# Patient Record
Sex: FEMALE | Race: BLACK OR AFRICAN AMERICAN
[De-identification: names, ages, dates, MRNs, and addresses within clinical notes are randomized per-mention and may not be internally consistent; named-entity substitution may affect disease eponyms.]

---

## 2019-03-05 ENCOUNTER — HOSPITAL ENCOUNTER (INPATIENT)
Dept: HOSPITAL 74 - JLDR | Age: 24
LOS: 2 days | Discharge: HOME | DRG: 560 | End: 2019-03-07
Attending: OBSTETRICS & GYNECOLOGY | Admitting: OBSTETRICS & GYNECOLOGY
Payer: COMMERCIAL

## 2019-03-05 VITALS — BODY MASS INDEX: 30.4 KG/M2

## 2019-03-05 DIAGNOSIS — D64.9: ICD-10-CM

## 2019-03-05 DIAGNOSIS — Z3A.39: ICD-10-CM

## 2019-03-05 LAB
ANION GAP SERPL CALC-SCNC: 10 MMOL/L (ref 8–16)
APTT BLD: 26.3 SECONDS (ref 25.2–36.5)
BASOPHILS # BLD: 0.5 % (ref 0–2)
BUN SERPL-MCNC: 7 MG/DL (ref 7–18)
CALCIUM SERPL-MCNC: 9 MG/DL (ref 8.5–10.1)
CHLORIDE SERPL-SCNC: 104 MMOL/L (ref 98–107)
CO2 SERPL-SCNC: 22 MMOL/L (ref 21–32)
CREAT SERPL-MCNC: 0.6 MG/DL (ref 0.55–1.3)
DEPRECATED RDW RBC AUTO: 17.7 % (ref 11.6–15.6)
EOSINOPHIL # BLD: 2.6 % (ref 0–4.5)
GLUCOSE SERPL-MCNC: 86 MG/DL (ref 74–106)
HCT VFR BLD CALC: 32 % (ref 32.4–45.2)
HGB BLD-MCNC: 10.7 GM/DL (ref 10.7–15.3)
INR BLD: 0.96 (ref 0.83–1.09)
LYMPHOCYTES # BLD: 18 % (ref 8–40)
MCH RBC QN AUTO: 28.3 PG (ref 25.7–33.7)
MCHC RBC AUTO-ENTMCNC: 33.4 G/DL (ref 32–36)
MCV RBC: 84.5 FL (ref 80–96)
MONOCYTES # BLD AUTO: 8.8 % (ref 3.8–10.2)
NEUTROPHILS # BLD: 70.1 % (ref 42.8–82.8)
PLATELET # BLD AUTO: 251 K/MM3 (ref 134–434)
PMV BLD: 9 FL (ref 7.5–11.1)
POTASSIUM SERPLBLD-SCNC: 4.3 MMOL/L (ref 3.5–5.1)
PT PNL PPP: 11.3 SEC (ref 9.7–13)
RBC # BLD AUTO: 3.79 M/MM3 (ref 3.6–5.2)
SODIUM SERPL-SCNC: 136 MMOL/L (ref 136–145)
WBC # BLD AUTO: 10 K/MM3 (ref 4–10)

## 2019-03-05 NOTE — PN
Delivery





- Delivery


Vaginal Delivery: No Problems, Spontaneous (baby boy delivered vx , Raz position

, cord around  ant shoulder untangled before delivery of the rest of the body, 

oral & nasal suction was done .placenta & membranes delievered completely, 

perineum & vagina was intact)


Episiotomy/Laceration: None


EBL (cc): 300





Delivery, Single Birth





- Stages of Labor


Date 1st Stage Initiatied: 19


Time 1st Stage Initiated: 09:00


Date 2nd Stage Initiated: 19


Time 2nd Stage Initiated: 23:20


Date of Delivery: 19


Time of Delivery: 23:26


Date Placenta Delivered: 19


Time Placenta Delivered: 23:30


Placenta: Yes: Spontaneous, Uterine Exploration





- Condition of Infant


Infant Gender: Male


Birth Weight: 7 lb 8 oz


Position: Left, OA (cord around anterior shoulder)


Total Hours ROM (Hrs/Mins): 20 min SROM clear  large 





- Apgar


  ** 1 Minute


Apgar Total Score: 9





  ** 5 Minutes


Apgar Total Score: 9





-  Feeding Plan


Initial Plan: Elected not to breastfeed exclusively throughout hospitalization





Remarks





- Remarks


Remarks: 


24 yrs  , 39.1 weeks by sono & 38.4 weeks by dates , gbs neg , admitted 

in labor 


PNC at [address] [place] [address] 


Intrapartum stadol 2 mg + phenrgan 25 mg iv stat given for labor analgesia 


pitocin augmentation was started 


intrapartum course uneventful
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
## 2019-03-06 LAB
BASOPHILS # BLD: 0.8 % (ref 0–2)
DEPRECATED RDW RBC AUTO: 17.5 % (ref 11.6–15.6)
EOSINOPHIL # BLD: 1.5 % (ref 0–4.5)
HCT VFR BLD CALC: 27.8 % (ref 32.4–45.2)
HGB BLD-MCNC: 9.2 GM/DL (ref 10.7–15.3)
LYMPHOCYTES # BLD: 16.4 % (ref 8–40)
MCH RBC QN AUTO: 27.7 PG (ref 25.7–33.7)
MCHC RBC AUTO-ENTMCNC: 33.1 G/DL (ref 32–36)
MCV RBC: 83.6 FL (ref 80–96)
MONOCYTES # BLD AUTO: 9.6 % (ref 3.8–10.2)
NEUTROPHILS # BLD: 71.7 % (ref 42.8–82.8)
PLATELET # BLD AUTO: 234 K/MM3 (ref 134–434)
PMV BLD: 8.6 FL (ref 7.5–11.1)
RBC # BLD AUTO: 3.33 M/MM3 (ref 3.6–5.2)
WBC # BLD AUTO: 13.5 K/MM3 (ref 4–10)

## 2019-03-06 RX ADMIN — ACETAMINOPHEN PRN MG: 325 TABLET ORAL at 10:17

## 2019-03-06 RX ADMIN — ACETAMINOPHEN PRN MG: 325 TABLET ORAL at 16:07

## 2019-03-06 RX ADMIN — IBUPROFEN PRN MG: 600 TABLET, FILM COATED ORAL at 10:16

## 2019-03-06 RX ADMIN — Medication SCH TAB: at 10:18

## 2019-03-06 RX ADMIN — FERROUS SULFATE TAB EC 324 MG (65 MG FE EQUIVALENT) SCH MG: 324 (65 FE) TABLET DELAYED RESPONSE at 17:40

## 2019-03-06 RX ADMIN — FERROUS SULFATE TAB EC 324 MG (65 MG FE EQUIVALENT) SCH MG: 324 (65 FE) TABLET DELAYED RESPONSE at 08:08

## 2019-03-06 RX ADMIN — IBUPROFEN PRN MG: 600 TABLET, FILM COATED ORAL at 05:54

## 2019-03-06 RX ADMIN — ACETAMINOPHEN PRN MG: 325 TABLET ORAL at 05:55

## 2019-03-06 RX ADMIN — IBUPROFEN PRN MG: 600 TABLET, FILM COATED ORAL at 16:06

## 2019-03-06 NOTE — PN
Post Partum Progress Note





- Subjective


Subjective: 


no complains 





Post Partum Day: 1


Type of Delivery: 


Vital Signs: 


 Vital Signs











Temperature  98.9 F   19 06:00


 


Pulse Rate  95 H  19 06:00


 


Respiratory Rate  18   19 06:00


 


Blood Pressure  115/62   19 06:00


 


O2 Sat by Pulse Oximetry (%)  99   19 01:00











Breast Exam: Yes: Soft, Other (BF attempting ).  No: Engorged


Uterus: Yes: Fundus Firm, Fundus below umbilicus, Non-tender


Lochia: Yes: Rubra


Lochia, amount: Moderate


Extremities: Yes: Calves non-tender


Perineum: Yes: Intact


Activity: Ambulating





- Labs


Labs: 


 CBC











WBC  10.0 K/mm3 (4.0-10.0)   19  20:10    


 


RBC  3.79 M/mm3 (3.60-5.2)   19  20:10    


 


Hgb  10.7 GM/dL (10.7-15.3)   19  20:10    


 


Hct  32.0 % (32.4-45.2)  L D 19  20:10    


 


MCV  84.5 fl (80-96)   19  20:10    


 


MCH  28.3 pg (25.7-33.7)   19  20:10    


 


MCHC  33.4 g/dl (32.0-36.0)   19  20:10    


 


RDW  17.7 % (11.6-15.6)  H  19  20:10    


 


Plt Count  251 K/MM3 (134-434)  D 19  20:10    


 


MPV  9.0 fl (7.5-11.1)   19  20:10    


 


Absolute Neuts (auto)  7.0 K/mm3 (1.5-8.0)   19  20:10    


 


Neutrophils %  70.1 % (42.8-82.8)   19  20:10    


 


Lymphocytes %  18.0 % (8-40)  D 19  20:10    


 


Monocytes %  8.8 % (3.8-10.2)   19  20:10    


 


Eosinophils %  2.6 % (0-4.5)  D 19  20:10    


 


Basophils %  0.5 % (0-2.0)   19  20:10    


 


Nucleated RBC %  0 % (0-0)   19  20:10    














Problem List





- Problems


(1) 39 weeks gestation of pregnancy


Code(s): Z3A.39 - 39 WEEKS GESTATION OF PREGNANCY   





(2) Labor established


Code(s): MMH4056 -    





(3) Anemia


Code(s): D64.9 - ANEMIA, UNSPECIFIED   


Qualifiers: 


   Anemia type: iron deficiency   Iron deficiency anemia type: inadequate 

dietary iron intake   Qualified Code(s): D50.8 - Other iron deficiency anemias 

  





Assessment/Plan


stable 


pp cbc pending 


discharge tomorrow.

## 2019-03-07 VITALS — DIASTOLIC BLOOD PRESSURE: 70 MMHG | HEART RATE: 94 BPM | SYSTOLIC BLOOD PRESSURE: 125 MMHG | TEMPERATURE: 97.7 F

## 2019-03-07 RX ADMIN — ACETAMINOPHEN PRN MG: 325 TABLET ORAL at 08:37

## 2019-03-07 RX ADMIN — FERROUS SULFATE TAB EC 324 MG (65 MG FE EQUIVALENT) SCH MG: 324 (65 FE) TABLET DELAYED RESPONSE at 08:36

## 2019-03-07 RX ADMIN — ACETAMINOPHEN PRN MG: 325 TABLET ORAL at 04:39

## 2019-03-07 RX ADMIN — IBUPROFEN PRN MG: 600 TABLET, FILM COATED ORAL at 08:36

## 2019-03-07 RX ADMIN — Medication SCH TAB: at 09:29

## 2019-03-07 RX ADMIN — IBUPROFEN PRN MG: 600 TABLET, FILM COATED ORAL at 04:39

## 2019-03-07 NOTE — DS
Physical Exam-GYN


Vital Signs: 


 Vital Signs











Temperature  97.4 F L  19 21:18


 


Pulse Rate  102 H  19 21:18


 


Respiratory Rate  20   19 21:18


 


Blood Pressure  110/58 L  19 21:18


 


O2 Sat by Pulse Oximetry (%)  99   19 01:00











Constitutional: Yes: Well Nourished, Pallor


Eyes: Yes: WNL


HENT: Yes: WNL


Neck: Yes: WNL


Cardiovascular: Yes: WNL


Respiratory: Yes: WNL


Gastrointestinal: Yes: WNL, Hemorrhoids


...Rectal Exam: Yes: WNL


Renal/: Yes: WNL


....Post Partum: Yes: Uterus firm, Moderate lochia rubra (perineum intact)


Breast(s): Yes: WNL


Musculoskeletal: Yes: WNL


Extremities: Yes: WNL.  No: Calf Tenderness


Integumentary: Yes: WNL


Neurological: Yes: WNL


...Motor Strength: WNL


Psychiatric: Yes: WNL, Alert, Oriented


Labs: 


 CBC, BMP





 19 07:00 





 19 20:10 











Delivery





- Delivery


Vaginal Delivery: No Problems, Spontaneous (baby boy delivered vx , Raz position

, cord around  ant shoulder untangled before delivery of the rest of the body, 

oral & nasal suction was done .placenta & membranes delievered completely, 

perineum & vagina was intact)


Type of Anesthesia: None


Episiotomy/Laceration: None


EBL (cc): 300





Delivery, Single Birth





- Stages of Labor


Date 1st Stage Initiatied: 19


Time 1st Stage Initiated: 09:00


Date 2nd Stage Initiated: 19


Time 2nd Stage Initiated: 23:20


Date of Delivery: 19


Time of Delivery: 23:26


Time Placenta Delivered: 23:30


Placenta: Yes: Spontaneous, Uterine Exploration





- Condition of Infant


Pediatrician/Neonatologist Present: No


Infant Gender: Male


Birth Weight: 7 lb 8 oz


Position: Left, OA (cord around anterior shoulder)


Total Hours ROM (Hrs/Mins): 20 min SROM clear  large 





- Apgar


  ** 1 Minute


Apgar Total Score: 9





  ** 5 Minutes


Apgar Total Score: 9





- [facility] Feeding Plan


Initial Plan: Elected not to breastfeed exclusively throughout hospitalization





Remarks





- Remarks


Remarks: 


24 yrs  , 39.1 weeks by sono & 38.4 weeks by dates , gbs neg , admitted 

in labor 


PNC at [address] [place] [address] 


Intrapartum stadol 2 mg + phenrgan 25 mg iv stat given for labor analgesia 


pitocin augmentation was started 


intrapartum course uneventful ,


pp course uneventful


anemia counselled


discharge today 








Discharge Summary


Reason For Visit: ADMIT-LABOR


Current Active Problems





39 weeks gestation of pregnancy (Acute)


Anemia (Acute)


Labor established (Acute)


Normal spontaneous vaginal delivery (Acute)








Condition: Stable





- Instructions


Diet, Activity, Other Instructions: 


Post Partum Instructions





DIET:


Continue good diet high in protein, calcium, and iron rich foods. Drink at 

least eight (8) glasses of water daily in addition to other fluids.


___   Regular diet








MEDICATIONS:


Continue prenatal vitamins and iron as previously directed. Motrin and Tylenol 

may be taken for minor discomfort. 





ACTIVITY:


Mild to moderate exercise may be started in two (2) weeks. Take frequent rest 

periods. Resume normal activity after six (6) week check up. 





WOUND CARE OF OPERATIVE SITE:


Continue use of perineal bottle until vaginal discharge stops. Keep area clean. 

Shower daily. Keep abdominal wound dry. Report any drainage or redness to 

physician. Tub baths, tampons and douches are not permitted for 6 weeks.





ct  Breast feeding  & or  Bottle feeding





BREAST CARE: (For those that are not breastfeeding): If engorgement occurs:


Wear tight fitting bra.


Take Tylenol or Motrin for pain.


Apply cold packs (ice in bags to each breast )





FAMILY PLANNING:


There are many birth control alternatives to pursue and they should be 

discussed at your first office visit. You may resume sexual activity after your 

six (6) week check up. (Remember, breastfeeding is not a contraceptive)





NEXT PHYSICIAN APPOINTMENT:


Be certain to call for a four- six (4-6) week appointment, unless otherwise 

directed.  





Call Clinic or got to Emergency Dept if you have any of the following:


   Heavy vaginal bleeding


   Painful urination


   Leg pain


   Unusual odor noted to vaginal bleeding


   High fever


   Red streaking noted on breast








Referrals: 


Yuli Randolph MD [Staff Physician] - 


Disposition: HOME





- Home Medications


Comprehensive Discharge Medication List: 


Ambulatory Orders





Acetaminophen [Tylenol .Regular Strength -] 650 mg PO Q3H PRN  tablet 03/06/19 


Ferrous Sulfate [Feosol] 325 mg PO BIDWM #60 tab 19 


Ibuprofen [Motrin -] 600 mg PO Q4H PRN #20 tablet 19 


Prenatal Vitamins (Sjr) - 1 tab PO DAILY #30 tablet 19